# Patient Record
Sex: FEMALE | Race: WHITE
[De-identification: names, ages, dates, MRNs, and addresses within clinical notes are randomized per-mention and may not be internally consistent; named-entity substitution may affect disease eponyms.]

---

## 2022-11-08 ENCOUNTER — HOSPITAL ENCOUNTER (EMERGENCY)
Dept: HOSPITAL 26 - MED | Age: 37
Discharge: HOME | End: 2022-11-08
Payer: SELF-PAY

## 2022-11-08 VITALS — SYSTOLIC BLOOD PRESSURE: 98 MMHG | DIASTOLIC BLOOD PRESSURE: 54 MMHG

## 2022-11-08 VITALS — BODY MASS INDEX: 23.6 KG/M2 | WEIGHT: 125 LBS | HEIGHT: 61 IN

## 2022-11-08 DIAGNOSIS — B34.9: Primary | ICD-10-CM

## 2022-11-08 DIAGNOSIS — Z20.822: ICD-10-CM

## 2022-11-08 DIAGNOSIS — Z79.899: ICD-10-CM

## 2022-11-08 DIAGNOSIS — J45.909: ICD-10-CM

## 2022-11-08 PROCEDURE — 87804 INFLUENZA ASSAY W/OPTIC: CPT

## 2022-11-08 PROCEDURE — 99283 EMERGENCY DEPT VISIT LOW MDM: CPT

## 2022-11-08 PROCEDURE — 87426 SARSCOV CORONAVIRUS AG IA: CPT

## 2022-11-08 PROCEDURE — 96372 THER/PROPH/DIAG INJ SC/IM: CPT

## 2022-11-08 NOTE — NUR
37F presents to ED with c/o Dizziness, SOB, Chest tightness since last night, 
and cough x3 days. Pt reports a dry, nonproductive cough since Saturday, chest 
tightness and feeling SOB, dizzy, and chills  last night and this morning. Pt 
reports trying breathing treatments and inhaler meds this morning with mild 
relief. Pt denies fevers, n/v/d, UTI symptoms, or ABD pain.

## 2022-11-08 NOTE — NUR
Patient discharged with v/s stable. Written and verbal after care instructions 
asthma, viral illness given and explained. 

Patient alert, oriented and verbalized understanding of instructions. 
Ambulatory with steady gait. All questions addressed prior to discharge. ID 
band removed. Patient advised to follow up with PMD. Rx of Levalbuterol HCI, 
prednisone, promethazine/dextromethorphan, albuterol sulfate given. Patient 
educated on indication of medication including possible reaction and side 
effects. Opportunity to ask questions provided and answered.